# Patient Record
Sex: FEMALE | Race: WHITE | NOT HISPANIC OR LATINO | URBAN - METROPOLITAN AREA
[De-identification: names, ages, dates, MRNs, and addresses within clinical notes are randomized per-mention and may not be internally consistent; named-entity substitution may affect disease eponyms.]

---

## 2019-01-30 ENCOUNTER — OUTPATIENT (OUTPATIENT)
Dept: OUTPATIENT SERVICES | Facility: HOSPITAL | Age: 39
LOS: 1 days | Discharge: HOME | End: 2019-01-30

## 2019-01-30 DIAGNOSIS — D64.9 ANEMIA, UNSPECIFIED: ICD-10-CM

## 2019-02-07 ENCOUNTER — RESULT REVIEW (OUTPATIENT)
Age: 39
End: 2019-02-07

## 2019-02-07 ENCOUNTER — APPOINTMENT (OUTPATIENT)
Dept: HEMATOLOGY ONCOLOGY | Facility: CLINIC | Age: 39
End: 2019-02-07
Payer: MEDICAID

## 2019-02-07 VITALS
OXYGEN SATURATION: 100 % | TEMPERATURE: 98.6 F | RESPIRATION RATE: 16 BRPM | BODY MASS INDEX: 29.03 KG/M2 | HEART RATE: 83 BPM | WEIGHT: 184.99 LBS | SYSTOLIC BLOOD PRESSURE: 132 MMHG | DIASTOLIC BLOOD PRESSURE: 82 MMHG | HEIGHT: 67.01 IN

## 2019-02-07 DIAGNOSIS — D50.9 IRON DEFICIENCY ANEMIA, UNSPECIFIED: ICD-10-CM

## 2019-02-07 LAB
HCT VFR BLD CALC: 29.4 % — LOW (ref 34.5–45)
HGB BLD-MCNC: 8.8 G/DL — LOW (ref 11.5–15.5)
IRON SATN MFR SERPL: 4 %
IRON SERPL-MCNC: 16 UG/DL
MCHC RBC-ENTMCNC: 19.4 PG — LOW (ref 27–34)
MCHC RBC-ENTMCNC: 30.1 G/DL — LOW (ref 32–36)
MCV RBC AUTO: 64.5 FL — LOW (ref 80–100)
PLATELET # BLD AUTO: 221 K/UL — SIGNIFICANT CHANGE UP (ref 150–400)
RBC # BLD: 4.55 M/UL — SIGNIFICANT CHANGE UP (ref 3.8–5.2)
RBC # FLD: 19.7 % — HIGH (ref 10.3–14.5)
TIBC SERPL-MCNC: 431 UG/DL
UIBC SERPL-MCNC: 415 UG/DL
WBC # BLD: 7.4 K/UL — SIGNIFICANT CHANGE UP (ref 3.8–10.5)
WBC # FLD AUTO: 7.4 K/UL — SIGNIFICANT CHANGE UP (ref 3.8–10.5)

## 2019-02-07 PROCEDURE — 99204 OFFICE O/P NEW MOD 45 MIN: CPT

## 2019-02-07 RX ORDER — FLUTICASONE PROPIONATE 50 UG/1
50 SPRAY, METERED NASAL DAILY
Refills: 0 | Status: ACTIVE | COMMUNITY
Start: 2019-02-07

## 2019-02-07 RX ORDER — ELECTROLYTES/DEXTROSE
SOLUTION, ORAL ORAL DAILY
Qty: 30 | Refills: 5 | Status: ACTIVE | COMMUNITY
Start: 2019-02-07 | End: 1900-01-01

## 2019-02-07 RX ORDER — CHLORHEXIDINE GLUCONATE 4 %
325 (65 FE) LIQUID (ML) TOPICAL
Qty: 30 | Refills: 5 | Status: ACTIVE | COMMUNITY
Start: 2019-02-07 | End: 1900-01-01

## 2019-02-07 NOTE — PHYSICAL EXAM
[Fully active, able to carry on all pre-disease performance without restriction] : Status 0 - Fully active, able to carry on all pre-disease performance without restriction [Normal] : affect appropriate [Ulcers] : no ulcers [Mucositis] : no mucositis [Thrush] : no thrush [Vesicles] : no vesicles [de-identified] : muscular healthy appearing female no acute sitress [de-identified] : bilateral tonsil enlargement [de-identified] : no liver or spleen enlargement [de-identified] : small tattoos on arms and torso

## 2019-02-07 NOTE — RESULTS/DATA
[FreeTextEntry1] : WBC 7.4 HGB 8.8 ,000;\par review of peripheral smear shows hypochromia, microcytosis, pencil forms; rare target cells. White cell morphology is normal; no blasts are seen. Platelet clumping is seen.Platelet count estimation at 400 000

## 2019-02-07 NOTE — HISTORY OF PRESENT ILLNESS
[Disease:__________________________] : Disease: [unfilled] [Treatment Protocol] : Treatment Protocol [Cardiovascular] : Cardiovascular [Constitutional] : Constitutional [ENT] : ENT [Dermatologic] : Dermatologic [Endocrine] : Endocrine [Gastrointestinal] : Gastrointestinal [Genitourinary] : Genitourinary [Gynecologic] : Gynecologic [Infectious] : Infectious [Musculoskeletal] : Musculoskeletal [Neurologic] : Neurologic [Pain] : Pain [Pulmonary] : Pulmonary [Hematologic] : Hematologic [de-identified] : Randy Guevara is a 38 year old lady [place] [facility] [place] who is sent to me by Dr Med Wolf for hematological evaluation. She has been physically active and uses a gym on a daily basis. She finds that she is more fatigued and she has been found to be anemic on a new patient evaluation with Dr Wolf.  Patient had lab studies done on 2019 and noted the following: hgb 7.5 g/dL, HC 26.1, mcv 64. She notes recent months of heavy menstrual bleeding cycles with extension of days and amount of blood lost. She attributes her anemia to this problem. She has been evaluated by GYN and she is awaiting the placement of a hormone emission IUD Miana. \par She is  para 5 2 1 2; she has had two vaginal deliveries over 9 years ago.\par She is surprised about her anemia as she used to donate blood as an [place]  in . She has not recently donated blood.\par There is no history of urinary tract bleeding and no history of rectal bleeding , ulcers, gastritis or black stool. There is no history of hemolysis, G 6 PD, new medications,  inherited anemia or thalassemia. She follows a "healthy diet" with vegetables, and herbal supplements; she has been losing weight by dieting and avoiding meat; she has had weight loss and gain in the past 6 months. \par  [FreeTextEntry1] : initial evaluation

## 2019-02-07 NOTE — OB HISTORY
[Normal Amount/Duration] : was of a normal amount and duration [Regular Cycle Intervals] : periods have been regular [___] : Living: [unfilled]

## 2019-02-07 NOTE — ASSESSMENT
[Palliative Care Plan] : not applicable at this time [FreeTextEntry1] : Randy Guevara is a 38 year old female with anemia. Her GYN history is notable for heavy menstrual cycles and she is awaiting hormone emitting IUD to control the menstruation. She has an OB history GP 5 1 2 2 with two live born ortega vaginal births. THis is of relevance because of the iron loss per pregnancy although some pregnancies were short duration. She had given blood approximately twenty years ago in the [employer] and she is not known to have had anemia or thalassemia. THere is no family history of thalassemia.\par The blood smear is compatible with a diagnosis of iron deficiency anemia; I will request serum ferritin, iron and total iron binding levels today.\par THe HGB is 8.8 and she lack major symptoms of fatigue and fainting. I would prefer oral iron therapy and not IV iron at this time. She has not been on oral iron supplementation; she is avoiding meat for weight control and health reasons although she is accepting a dietary recommendation to eat red meat three times per week.\par She may begin oral iron 65 mg  two tablets PO with oranges or juice every other day as this regimen has good GI tolerance.\par Side effects of iron therapy were discussed with the patient and she accepts this treatment regimen.\par She will be seen in follow up in 2 months.\par Recommend oral multi vitamins once daily, Colace daily (she uses coconut-cynthia preparation)  and use of acidification of iron containing vegetables (e.g drinking orange juice with spinach). Patient was seen with A Kojo MCCOLLUM

## 2019-02-07 NOTE — CONSULT LETTER
[Dear  ___] : Dear  [unfilled], [Consult Letter:] : I had the pleasure of evaluating your patient, [unfilled]. [Please see my note below.] : Please see my note below. [Consult Closing:] : Thank you very much for allowing me to participate in the care of this patient.  If you have any questions, please do not hesitate to contact me. [Sincerely,] : Sincerely, [FreeTextEntry2] : Med Wolf MD\par [address]\[place] [address] [FreeTextEntry3] : Fabricio Livingston MD

## 2019-02-07 NOTE — REASON FOR VISIT
[Initial Consultation] : an initial consultation for [Blood Count Assessment] : blood count assessment [Other: _____] : [unfilled] [FreeTextEntry2] : I have a low blood count and I was sent to receive iron

## 2019-02-11 LAB — FERRITIN SERPL-MCNC: 10 NG/ML

## 2020-10-22 ENCOUNTER — RESULT REVIEW (OUTPATIENT)
Age: 40
End: 2020-10-22

## 2021-05-01 ENCOUNTER — TRANSCRIPTION ENCOUNTER (OUTPATIENT)
Age: 41
End: 2021-05-01